# Patient Record
Sex: FEMALE | Race: WHITE
[De-identification: names, ages, dates, MRNs, and addresses within clinical notes are randomized per-mention and may not be internally consistent; named-entity substitution may affect disease eponyms.]

---

## 2020-10-28 ENCOUNTER — HOSPITAL ENCOUNTER (OUTPATIENT)
Dept: HOSPITAL 33 - SDC-PAIN | Age: 55
Discharge: HOME | End: 2020-10-28
Attending: PSYCHIATRY & NEUROLOGY
Payer: COMMERCIAL

## 2020-10-28 DIAGNOSIS — G62.9: ICD-10-CM

## 2020-10-28 DIAGNOSIS — Z79.899: ICD-10-CM

## 2020-10-28 DIAGNOSIS — K21.9: ICD-10-CM

## 2020-10-28 DIAGNOSIS — J44.9: ICD-10-CM

## 2020-10-28 DIAGNOSIS — M54.16: Primary | ICD-10-CM

## 2020-10-28 DIAGNOSIS — F41.8: ICD-10-CM

## 2020-10-28 PROCEDURE — 77003 FLUOROGUIDE FOR SPINE INJECT: CPT

## 2020-10-28 PROCEDURE — 72100 X-RAY EXAM L-S SPINE 2/3 VWS: CPT

## 2020-10-28 PROCEDURE — 64484 NJX AA&/STRD TFRM EPI L/S EA: CPT

## 2020-10-28 PROCEDURE — 64483 NJX AA&/STRD TFRM EPI L/S 1: CPT

## 2020-10-28 NOTE — XRAY
Indication: Right L3-L5 transforaminal RETA.



Intraoperative fluoroscopy was provided for 44 seconds.  4 digital spot images

submitted for interpretation demonstrates posterior needle tips projecting

over the expected right L3 and L4 nerve roots.  Small amount of contrast

injected for needle tip placement.  Correlate with intraoperative

findings/report.

## 2021-04-21 ENCOUNTER — HOSPITAL ENCOUNTER (OUTPATIENT)
Dept: HOSPITAL 33 - SDC-PAIN | Age: 56
Discharge: HOME | End: 2021-04-21
Attending: PSYCHIATRY & NEUROLOGY
Payer: COMMERCIAL

## 2021-04-21 DIAGNOSIS — M54.16: Primary | ICD-10-CM

## 2021-04-21 DIAGNOSIS — Z79.899: ICD-10-CM

## 2021-04-21 DIAGNOSIS — K21.9: ICD-10-CM

## 2021-04-21 DIAGNOSIS — F41.8: ICD-10-CM

## 2021-04-21 DIAGNOSIS — J44.9: ICD-10-CM

## 2021-04-21 DIAGNOSIS — G62.9: ICD-10-CM

## 2021-04-21 PROCEDURE — 72100 X-RAY EXAM L-S SPINE 2/3 VWS: CPT

## 2021-04-21 PROCEDURE — C1778 LEAD, NEUROSTIMULATOR: HCPCS

## 2021-04-21 PROCEDURE — 77002 NEEDLE LOCALIZATION BY XRAY: CPT

## 2021-04-21 PROCEDURE — 63650 IMPLANT NEUROELECTRODES: CPT

## 2021-04-22 NOTE — XRAY
Indication: Spinal cord stimulator trial.



Intraoperative fluoroscopy provided for 2 minutes 50 seconds.  2 digital spot

images submitted for interpretation demonstrates single epidural spinal lead

terminating mid thoracic level.  Correlate with intraoperative findings/report.